# Patient Record
Sex: FEMALE | Race: WHITE | URBAN - METROPOLITAN AREA
[De-identification: names, ages, dates, MRNs, and addresses within clinical notes are randomized per-mention and may not be internally consistent; named-entity substitution may affect disease eponyms.]

---

## 2018-06-15 ENCOUNTER — TELEPHONE (OUTPATIENT)
Dept: FAMILY MEDICINE | Facility: CLINIC | Age: 18
End: 2018-06-15

## 2018-06-15 ENCOUNTER — OFFICE VISIT (OUTPATIENT)
Dept: ORTHOPEDICS | Facility: CLINIC | Age: 18
End: 2018-06-15
Payer: COMMERCIAL

## 2018-06-15 ENCOUNTER — RADIANT APPOINTMENT (OUTPATIENT)
Dept: GENERAL RADIOLOGY | Facility: CLINIC | Age: 18
End: 2018-06-15
Attending: FAMILY MEDICINE
Payer: COMMERCIAL

## 2018-06-15 VITALS
BODY MASS INDEX: 17.95 KG/M2 | DIASTOLIC BLOOD PRESSURE: 74 MMHG | SYSTOLIC BLOOD PRESSURE: 118 MMHG | HEART RATE: 73 BPM | WEIGHT: 111.7 LBS | HEIGHT: 66 IN

## 2018-06-15 DIAGNOSIS — M25.571 RIGHT ANKLE PAIN: ICD-10-CM

## 2018-06-15 DIAGNOSIS — M25.571 ACUTE RIGHT ANKLE PAIN: Primary | ICD-10-CM

## 2018-06-15 DIAGNOSIS — S93.491A SPRAIN OF OTHER LIGAMENT OF RIGHT ANKLE, INITIAL ENCOUNTER: ICD-10-CM

## 2018-06-15 DIAGNOSIS — S89.91XA INJURY OF RIGHT KNEE, INITIAL ENCOUNTER: ICD-10-CM

## 2018-06-15 PROCEDURE — 99203 OFFICE O/P NEW LOW 30 MIN: CPT | Performed by: FAMILY MEDICINE

## 2018-06-15 PROCEDURE — 73610 X-RAY EXAM OF ANKLE: CPT | Mod: RT

## 2018-06-15 NOTE — TELEPHONE ENCOUNTER
Mother states that patient was running and feel and twiested her knee and ankle.  Please call.    Thank you.

## 2018-06-15 NOTE — MR AVS SNAPSHOT
"              After Visit Summary   6/15/2018    Aura Weaver    MRN: [id number]           Patient Information     Date Of Birth          2000        Visit Information        Provider Department      6/15/2018 1:00 PM Manny Quiros,  [facility]        Today's Diagnoses     Acute right ankle pain    -  1    Injury of right knee, initial encounter        Sprain of other ligament of right ankle, initial encounter           Follow-ups after your visit        Who to contact     If you have questions or need follow up information about today's clinic visit or your schedule please contact [facility] directly at [phone].  Normal or non-critical lab and imaging results will be communicated to you by [employer] [employer], letter or phone within 4 business days after the clinic has received the results. If you do not hear from us within 7 days, please contact the clinic through MyChart or phone. If you have a critical or abnormal lab result, we will notify you by phone as soon as possible.  Submit refill requests through [employer] or call your pharmacy and they will forward the refill request to us. Please allow 3 business days for your refill to be completed.          Additional Information About Your Visit        MyChart Information     Xenith lets you send messages to your doctor, view your test results, renew your prescriptions, schedule appointments and more. To sign up, go to [email or web address] . Click on \"Log in\" on the left side of the screen, which will take you to the Welcome page. Then click on \"Sign up Now\" on the right side of the page.     You will be asked to enter the access code listed below, as well as some personal information. Please follow the directions to create your username and password.     Your access code is: [email or web address]  Expires: 2018  5:00 PM     Your access code will  in 90 days. If you need help or a new " "code, please call your [facility] or [phone].        Care EveryWhere ID     This is your Care EveryWhere ID. This could be used by other organizations to access your [facility] medical records  [id number]        Your Vitals Were     Pulse Height BMI (Body Mass Index)             73 5' 5.67\" (1.668 m) 18.21 kg/m2          Blood Pressure from Last 3 Encounters:   06/15/18 118/74    Weight from Last 3 Encounters:   06/15/18 111 lb 11.2 oz (50.7 kg) (18 %)*   04/03/10 65 lb 3.2 oz (29.6 kg) (4 %)*   03/15/10 65 lb (29.5 kg) (5 %)*     * Growth percentiles are based on [facility] 2-20 Years data.                 Today's Medication Changes          These changes are accurate as of 6/15/18  5:00 PM.  If you have any questions, ask your nurse or doctor.               Start taking these medicines.        Dose/Directions    order for DME   Used for:  Acute right ankle pain, Sprain of other ligament of right ankle, initial encounter   Started by:  Manny Quiros DO        Equipment being ordered: Tri perfecto ankle brace, SM   Quantity:  1 Device   Refills:  0       order for DME   Used for:  Injury of right knee, initial encounter   Started by:  Manny Quiros DO        Equipment being ordered: Neoprene Knee Sleeve, SM   Quantity:  1 Device   Refills:  0            Where to get your medicines      Some of these will need a paper prescription and others can be bought over the counter.  Ask your nurse if you have questions.     You don't need a prescription for these medications     order for DME    order for DME                Primary Care Provider Office Phone # Fax #    Blayne Dooley [phone] [address] [facility] [address]        Equal Access to Services     ROSELINE DIOP AH: Sharyn gateso Socatherine, waaxda luqadaha, qaybta kaalmada radha, natalee hopkins. So [facility] [phone].    ATENCIÓN: Si habla español, tiene a hughes disposición " servicios gratuitos de asistencia lingüística. Ashley zhou [phone].    We comply with applicable federal civil rights laws and [place] laws. We do not discriminate on the basis of race, color, national origin, age, disability, sex, sexual orientation, or gender identity.            Thank you!     Thank you for choosing [facility]  for your care. Our goal is always to provide you with excellent care. Hearing back from our patients is one way we can continue to improve our services. Please take a few minutes to complete the written survey that you may receive in the mail after your visit with us. Thank you!             Your Updated Medication List - Protect others around you: Learn how to safely use, store and throw away your medicines at www.disposemymeds.org.          This list is accurate as of 6/15/18  5:00 PM.  Always use your most recent med list.                   Brand Name Dispense Instructions for use Diagnosis    * albuterol (2.5 MG/3ML) 0.083% neb solution      None Entered        * albuterol (2.5 MG/3ML) 0.083% neb solution     1 Each    ONE NEBULIZATION 4 TIMES DAILY AS NEEDED    Wheezing       * albuterol (2.5 MG/3ML) 0.083% neb solution     1 Each    ONE NEBULIZATION every 4 hours as needed.    Wheezing       MOTRIN PO      None Entered        order for DME     1 Device    Equipment being ordered: Tri perfecto ankle brace, SM    Acute right ankle pain, Sprain of other ligament of right ankle, initial encounter       order for DME     1 Device    Equipment being ordered: Neoprene Knee Sleeve, SM    Injury of right knee, initial encounter       * Notice:  This list has 3 medication(s) that are the same as other medications prescribed for you. Read the directions carefully, and ask your doctor or other care provider to review them with you.

## 2018-06-15 NOTE — TELEPHONE ENCOUNTER
Patient was running and fell, twisted right knee and ankle.   Some swelling  Painful to walk    Instructed patient needs to be seen. Advised patient to go to [facility] walk in clinic at [facility]  Patient verbalized understanding and agreed    Gemini ELAINEN, RN, CPN

## 2018-06-15 NOTE — LETTER
"    6/15/2018         RE: Aura Weaver  [id number]   [place] [place] [facility] [address]        Dear Colleague,    Thank you for referring your patient, Aura Weaver, to the [facility]. Please see a copy of my visit note below.    Aura Weaver  :  2000  DOS: 6/15/2018  MRN: [id number]    Sports Medicine Clinic Visit    PCP: Blayne Dooley    Aura Weaver is a 17 year old female who is seen as an AIC patient presenting with right knee and ankle pain    Injury: tripped while running, twisted her knee and jammed her right ankle, today 6/15/18.  Pain located over right knee, diffuse, and anterior ankle.  Additional Features:  Positive: swelling and stiffness in the knee.  Symptoms are better with Ice and elevation of both the knee and ankle.  Symptoms are worse with: standing and walking.  Other evaluation and/or treatments so far consists of: Ice and Elevation.  Recent imaging completed: No recent imaging completed.  Prior History of related problems: injury to the knee during cross country, resolved    Social History: She attends [employer]. She runs    Review of Systems  Musculoskeletal: as above  Remainder of review of systems is negative including constitutional, CV, pulmonary, GI, Skin and Neurologic except as noted in HPI or medical history.    Past Medical History:   Diagnosis Date     Asthma      Past Surgical History:   Procedure Laterality Date     NO HISTORY OF SURGERY       Objective  /74  Pulse 73  Ht 5' 5.67\" (1.668 m)  Wt 111 lb 11.2 oz (50.7 kg)  BMI 18.21 kg/m2    General: healthy, alert and in no distress    HEENT: no scleral icterus or conjunctival erythema   Skin: no suspicious lesions or rash. No jaundice.   CV: regular rhythm by palpation, 2+ distal pulses, no pedal edema    Resp: normal respiratory effort without conversational dyspnea   Psych: normal mood and affect    Gait: antalgic, appropriate coordination and balance   Neuro: normal light touch " sensory exam of the extremities. Motor strength as noted below     Right Knee exam    ROM:        Full active and passive ROM with flexion and extension       Mild pain with terminal flexion    Inspection:       no visible ecchymosis       no visible edema or effusion    Skin:       no visible deformities       well perfused       capillary refill brisk    Patellar Motion:        Normal patellar tracking noted through range of motion    Tender:        lateral joint line       infrapatellar tendon       Inferior pole of patella    Non Tender:         remainder of knee area        medial patellar border        medial joint line        along MCL        distal IT Band       pes anserine bursa       either hamstring tendon    Special Tests:        positive (+) Yas for pain with lateral stress       neg (-) Lachman       neg (-) anterior drawer       neg (-) posterior drawer       neg (-) varus at 0 deg and 30 deg       neg (-) valgus at 0 deg and 30 deg       mild pain with forced extension    Evaluation of ipsilateral kinetic chain       normal strength with hip extension and abduction    Right Ankle/Foot Exam:     Inspection:       no visible ecchymosis       edema noted mild over lateral midfoot dorsally     Foot inspection:       no deformity noted     ROM:        full ROM with dorsiflexion, plantarflexion, inversion and eversion       limited by pain with passive inversion     Tender:       Extensor retinaculum, dorsal midfoot laterally     Non-Tender:       remainder of foot and ankle       lateral malleolus       lateral ankle ligaments       deltoid ligament       proximal 5th metatarsal       dorsal tibiotalar joint       tarsal navicular       medial malleolus       distal tibiofibular joint       proximal 1st and 2nd intermetatarsal ligament       tibialis posterior tendon, posterior to medial malleolus       peroneal tendon sheath     Skin:       well perfused       capillary refill less than 2  seconds     Special Tests:       neg (-) anterior drawer        neg (-) talar tilt        neg (-) external rotation testing      Gait:       antalgic gait     Proprioception:        limited with single leg stance       deferred    Radiology  XR images independently visualized and reviewed with patient today in clinic of right ankle  No clear acute findings, no chronic changes, will follow final radiology read    Assessment:  1. Acute right ankle pain    2. Injury of right knee, initial encounter    3. Sprain of other ligament of right ankle, initial encounter        Plan:  Discussed the assessment with the patient.  Follow up: 2 weeks prn  Knee sleeve and ankle brace provided, good comfort  Assistive device use reviewed  RICE reviewed  Clinically knee and ankle sprain only, should improve with conservative care  Could consider advanced imaging but doubt this will be needed  We discussed modified progressive pain-free activity as tolerated  Home handouts provided and supportive care reviewed  All questions were answered today  Contact us with additional questions or concerns  Signs and sx of concern reviewed      Manny Quiros DO, CAQ  Primary Care Sports Medicine  [facility]               Disclaimer: This note consists of symbols derived from keyboarding, dictation and/or voice recognition software. As a result, there may be errors in the script that have gone undetected. Please consider this when interpreting information found in this chart.      Again, thank you for allowing me to participate in the care of your patient.        Sincerely,        Manny Quiros DO

## 2018-06-19 ENCOUNTER — TELEPHONE (OUTPATIENT)
Dept: ORTHOPEDICS | Facility: CLINIC | Age: 18
End: 2018-06-19

## 2018-06-19 NOTE — TELEPHONE ENCOUNTER
Call made to patient in follow up to AIC Visit.  LVM for return call.      I m calling from [facility] in follow up to your recent visit on 6/15/18.  Please return our call at [phone] option 3, If you reach the voice mail please leave your contact number and a good time for us to reach you.

## 2018-07-20 ENCOUNTER — TELEPHONE (OUTPATIENT)
Dept: FAMILY MEDICINE | Facility: CLINIC | Age: 18
End: 2018-07-20

## 2018-07-20 ENCOUNTER — OFFICE VISIT (OUTPATIENT)
Dept: FAMILY MEDICINE | Facility: CLINIC | Age: 18
End: 2018-07-20
Payer: COMMERCIAL

## 2018-07-20 VITALS
HEIGHT: 65 IN | OXYGEN SATURATION: 97 % | TEMPERATURE: 99.1 F | BODY MASS INDEX: 18.49 KG/M2 | DIASTOLIC BLOOD PRESSURE: 75 MMHG | SYSTOLIC BLOOD PRESSURE: 120 MMHG | HEART RATE: 62 BPM | RESPIRATION RATE: 16 BRPM | WEIGHT: 111 LBS

## 2018-07-20 DIAGNOSIS — Z23 NEED FOR VACCINATION: Primary | ICD-10-CM

## 2018-07-20 DIAGNOSIS — Z00.00 PHYSICAL EXAM, ROUTINE: ICD-10-CM

## 2018-07-20 PROCEDURE — 90471 IMMUNIZATION ADMIN: CPT | Performed by: FAMILY MEDICINE

## 2018-07-20 PROCEDURE — 90746 HEPB VACCINE 3 DOSE ADULT IM: CPT | Performed by: FAMILY MEDICINE

## 2018-07-20 PROCEDURE — 99213 OFFICE O/P EST LOW 20 MIN: CPT | Mod: 25 | Performed by: FAMILY MEDICINE

## 2018-07-20 ASSESSMENT — PAIN SCALES - GENERAL: PAINLEVEL: NO PAIN (0)

## 2018-07-20 NOTE — NURSING NOTE
"Chief Complaint   Patient presents with     Labs Only     Health Maintenance     Needs HPV, Hep B # 3, Chlamydia and HIV Screen       Initial /75  Pulse 62  Temp 99.1  F (37.3  C) (Oral)  Resp 16  Ht 5' 4.57\" (1.64 m)  Wt 111 lb (50.3 kg)  SpO2 97%  Breastfeeding? No  BMI 18.72 kg/m2 Estimated body mass index is 18.72 kg/(m^2) as calculated from the following:    Height as of this encounter: 5' 4.57\" (1.64 m).    Weight as of this encounter: 111 lb (50.3 kg).  Medication Reconciliation: complete  Roya Hughes MA    "

## 2018-07-20 NOTE — MR AVS SNAPSHOT
"              After Visit Summary   7/20/2018    Aura Weaver    MRN: [id number]           Patient Information     Date Of Birth          2000        Visit Information        Provider Department      7/20/2018 3:20 PM Corine Nicole MD [facility]        Today's Diagnoses     Need for vaccination    -  1    Physical exam, routine           Follow-ups after your visit        Who to contact     If you have questions or need follow up information about today's clinic visit or your schedule please contact [facility] directly at [phone].  Normal or non-critical lab and imaging results will be communicated to you by MyChart, letter or phone within 4 business days after the clinic has received the results. If you do not hear from us within 7 days, please contact the clinic through MyChart or phone. If you have a critical or abnormal lab result, we will notify you by phone as soon as possible.  Submit refill requests through [employer] or call your pharmacy and they will forward the refill request to us. Please allow 3 business days for your refill to be completed.          Additional Information About Your Visit        Care EveryWhere ID     This is your Care EveryWhere ID. This could be used by other organizations to access your [facility] medical records  [id number]        Your Vitals Were     Pulse Temperature Respirations Height Pulse Oximetry Breastfeeding?    62 99.1  F (37.3  C) (Oral) 16 5' 4.57\" (1.64 m) 97% No    BMI (Body Mass Index)                   18.72 kg/m2            Blood Pressure from Last 3 Encounters:   07/20/18 120/75   06/15/18 118/74    Weight from Last 3 Encounters:   07/20/18 111 lb (50.3 kg) (17 %)*   06/15/18 111 lb 11.2 oz (50.7 kg) (18 %)*   04/03/10 65 lb 3.2 oz (29.6 kg) (4 %)*     * Growth percentiles are based on CDC 2-20 Years data.              We Performed the Following     1st  Administration  [[id number]]     HEPATITIS B VACCINE,  ADULT  [[id number]]     " SCREENING QUESTIONS FOR ADULT IMMUNIZATIONS        Primary Care Provider Office Phone # Fax #    Blayne Dooley [phone] [address] [facility] [address]        Equal Access to Services     ROSELINE DIOP : Hadii aad ku hadoctavioo Soomaali, waaxda luqadaha, qaybta kaalmada adeegyada, natalee paulinon nimo mckeon garo hopkins. So [facility] [phone].    ATENCIÓN: Si habla español, tiene a hughes disposición servicios gratuitos de asistencia lingüística. Llame al [phone].    We comply with applicable federal civil rights laws and [place] laws. We do not discriminate on the basis of race, color, national origin, age, disability, sex, sexual orientation, or gender identity.            Thank you!     Thank you for choosing [facility]  for your care. Our goal is always to provide you with excellent care. Hearing back from our patients is one way we can continue to improve our services. Please take a few minutes to complete the written survey that you may receive in the mail after your visit with us. Thank you!             Your Updated Medication List - Protect others around you: Learn how to safely use, store and throw away your medicines at www.disposemymeds.org.          This list is accurate as of 7/20/18  4:32 PM.  Always use your most recent med list.                   Brand Name Dispense Instructions for use Diagnosis    * albuterol (2.5 MG/3ML) 0.083% neb solution      None Entered        * albuterol (2.5 MG/3ML) 0.083% neb solution     1 Each    ONE NEBULIZATION 4 TIMES DAILY AS NEEDED    Wheezing       * albuterol (2.5 MG/3ML) 0.083% neb solution     1 Each    ONE NEBULIZATION every 4 hours as needed.    Wheezing       MOTRIN PO      None Entered        order for DME     1 Device    Equipment being ordered: Tri perfecto ankle brace, SM    Acute right ankle pain, Sprain of other ligament of right ankle, initial encounter       order for DME     1 Device    Equipment  being ordered: Neoprene Knee Sleeve, SM    Injury of right knee, initial encounter       * Notice:  This list has 3 medication(s) that are the same as other medications prescribed for you. Read the directions carefully, and ask your doctor or other care provider to review them with you.

## 2018-07-20 NOTE — PROGRESS NOTES
"SUBJECTIVE:    Chief Complaint   Patient presents with     Labs Only     pt had a physical at [facility] but needs labs drawn. leaving for collage     Health Maintenance     Needs HPV, Hep B # 3, Chlamydia and HIV Screen        Aura Weaver is a 17 year old female who presents to the office because of need for vaccines and labs in preparation of going to college  She had physical exam at [facility].  She states she has never had sex-  The physical form mentions testing of hgb/  \"hematocrit , ferritin,  HIV, chlamydia.  After reviewing the forms and discussing with her mother she decided she did not want any lab testing.    She wants Hep B #3  She and mother say they do not want her to get HPV vaccine now-  She has had none-  Not sure if their insurance will cover          Past Medical History:   Diagnosis Date     Asthma      There is no problem list on file for this patient.      ALLERGIES:  Review of patient's allergies indicates no known allergies.      Current Outpatient Prescriptions on File Prior to Visit:  ALBUTEROL SULFATE (2.5 MG/3ML) 0.083% IN NEBU None Entered   ALBUTEROL SULFATE (2.5 MG/3ML) 0.083% IN NEBU ONE NEBULIZATION 4 TIMES DAILY AS NEEDED (Patient not taking: No sig reported)   ALBUTEROL SULFATE (2.5 MG/3ML) 0.083% IN NEBU ONE NEBULIZATION every 4 hours as needed. (Patient not taking: No sig reported)   MOTRIN OR None Entered   order for DME Equipment being ordered: Tri perfecto ankle brace, SM (Patient not taking: Reported on 7/20/2018)   order for DME Equipment being ordered: Neoprene Knee Sleeve, SM (Patient not taking: Reported on 7/20/2018)     No current facility-administered medications on file prior to visit.     Social History   Substance Use Topics     Smoking status: Never Smoker     Smokeless tobacco: Never Used     Alcohol use Not on file       History reviewed. No pertinent family history.      ROS:CONSTITUTIONAL:NEGATIVE for fever, chills,    INTEGUMENTARY/SKIN: NEGATIVE for " "worrisome rashes   EYES: NEGATIVE for vision changes or irritation  ENT/MOUTH: NEGATIVE for ear, mouth and throat problems    EXAM:  /75  Pulse 62  Temp 99.1  F (37.3  C) (Oral)  Resp 16  Ht 5' 4.57\" (1.64 m)  Wt 111 lb (50.3 kg)  SpO2 97%  Breastfeeding? No  BMI 18.72 kg/m2  GENERAL APPEARANCE: healthy, alert and no distress      EYES: EOMI,   conjunctiva clear  HENT: External ears with no swelling or lesions   Nose and lips without  Swelling, ulcers, erythema or lesions  NECK: normal pain free ROM  RESP: no labored respirations, no tachypnea  EXTREMITIES: no peripheral edema  Full ROM without expression of pain or limitation x 4 extremities  NEURO: Normal strength and tone, sensory exam grossly normal,  normal speech and mentation  SKIN: no suspicious lesions or rashes  PSYCH: mentation and affect appears normal and patient appearance--appropriately groomed              Need for vaccination     - HEPATITIS B VACCINE,  ADULT  [[id number]]  - 1st  Administration  [[id number]]    Physical exam, routine     Limited exam requested by patient  - HEPATITIS B VACCINE,  ADULT  [[id number]]  - 1st  Administration  [[id number]]    We discussed HPV vaccination at length, -  That she wants to have full coverage / protection before initiating sexual relations-  She will investigate about insurance coverage        "